# Patient Record
Sex: FEMALE | URBAN - METROPOLITAN AREA
[De-identification: names, ages, dates, MRNs, and addresses within clinical notes are randomized per-mention and may not be internally consistent; named-entity substitution may affect disease eponyms.]

---

## 2020-07-25 ENCOUNTER — TELEPHONE ENCOUNTER (OUTPATIENT)
Dept: URBAN - METROPOLITAN AREA CLINIC 13 | Facility: CLINIC | Age: 65
End: 2020-07-25

## 2020-07-25 RX ORDER — POLYETHYLENE GLYCOL 3350, SODIUM CHLORIDE, SODIUM BICARBONATE AND POTASSIUM CHLORIDE WITH LEMON FLAVOR 420; 11.2; 5.72; 1.48 G/4L; G/4L; G/4L; G/4L
TAKE 1/2 GALLON AT 5:00 PM DAY BEFORE PROCEDURE, TAKE SECOND 1/2 OF GALLON 6 HRS PRIOR TO PROCEDURE POWDER, FOR SOLUTION ORAL
Qty: 1 | Refills: 0 | OUTPATIENT
Start: 2019-10-31 | End: 2019-11-13

## 2020-07-25 RX ORDER — BUPROPION HYDROCHLORIDE 150 MG/1
TAKE 1 TABLET TWICE DAILY TABLET, FILM COATED ORAL
Refills: 0 | OUTPATIENT
Start: 2014-07-08 | End: 2014-07-30

## 2020-07-25 RX ORDER — POLYETHYLENE GLYCOL 3350, SODIUM SULFATE, SODIUM CHLORIDE, POTASSIUM CHLORIDE, ASCORBIC ACID, SODIUM ASCORBATE 7.5-2.691G
USE AS DIRECTED KIT ORAL
Qty: 1 | Refills: 0 | OUTPATIENT
Start: 2014-07-08 | End: 2014-07-30

## 2020-07-25 RX ORDER — IRBESARTAN/HYDROCHLOROTHIAZIDE 300-12.5MG
TAKE 1 TABLET DAILY TABLET ORAL
Refills: 0 | OUTPATIENT
Start: 2005-10-03 | End: 2014-07-08

## 2020-07-25 RX ORDER — TRAMADOL HYDROCHLORIDE AND ACETAMINOPHEN 37.5; 325 MG/1; MG/1
TAKE 1 TABLET 3 TIMES DAILY AS NEEDED TABLET, FILM COATED ORAL
Qty: 30 | Refills: 0 | OUTPATIENT
Start: 2014-08-07 | End: 2019-10-31

## 2020-07-25 RX ORDER — TIOTROPIUM BROMIDE 18 UG/1
INHALE CONTENTS OF CAPSULE ONCE DAY CAPSULE ORAL; RESPIRATORY (INHALATION)
Refills: 0 | OUTPATIENT
Start: 2013-08-03 | End: 2019-10-31

## 2020-07-25 RX ORDER — NEOMYCIN SULFATE, POLYMYXIN B SULFATE AND DEXAMETHASONE 3.5; 10000; 1 MG/ML; [USP'U]/ML; MG/ML
INSTILL 1 DROP TWICE DAILY IN BOTH EYES SUSPENSION/ DROPS OPHTHALMIC
Refills: 0 | OUTPATIENT
Start: 2014-02-07 | End: 2019-10-31

## 2020-07-25 RX ORDER — LISINOPRIL 20 MG/1
TAKE 1 TABLET DAILY TABLET ORAL
Refills: 0 | OUTPATIENT
Start: 2014-07-08 | End: 2019-11-13

## 2020-07-25 RX ORDER — TIOTROPIUM BROMIDE 18 UG/1
INHALE CONTENTS OF 1 CAPSULE ONCE DAILY CAPSULE ORAL; RESPIRATORY (INHALATION)
Refills: 0 | OUTPATIENT
End: 2019-11-13

## 2020-07-26 ENCOUNTER — TELEPHONE ENCOUNTER (OUTPATIENT)
Dept: URBAN - METROPOLITAN AREA CLINIC 13 | Facility: CLINIC | Age: 65
End: 2020-07-26

## 2020-07-26 RX ORDER — FLUTICASONE PROPIONATE AND SALMETEROL 50; 250 UG/1; UG/1
POWDER RESPIRATORY (INHALATION)
Qty: 60 | Refills: 0 | Status: ACTIVE | COMMUNITY
Start: 2013-08-05

## 2020-07-26 RX ORDER — FLUTICASONE PROPIONATE AND SALMETEROL 50; 250 UG/1; UG/1
POWDER RESPIRATORY (INHALATION)
Qty: 60 | Refills: 0 | Status: ACTIVE | COMMUNITY
Start: 2013-10-14

## 2020-07-26 RX ORDER — HYDROCHLOROTHIAZIDE 12.5 MG/1
TAKE 1 CAPSULE DAILY CAPSULE ORAL
Refills: 0 | Status: ACTIVE | COMMUNITY
Start: 2014-07-08

## 2020-07-26 RX ORDER — HYDROCODONE BITARTRATE AND ACETAMINOPHEN 5; 325 MG/1; MG/1
TAKE 1 TABLET EVERY 4 TO 6 HOURS AS NEEDED TABLET ORAL
Refills: 0 | Status: ACTIVE | COMMUNITY
Start: 2019-10-09

## 2020-07-26 RX ORDER — AMLODIPINE BESYLATE 5 MG/1
TAKE 1 TABLET DAILY TABLET ORAL
Refills: 0 | Status: ACTIVE | COMMUNITY

## 2020-07-26 RX ORDER — DICLOFENAC SODIUM 1 MG/ML
SOLUTION OPHTHALMIC
Qty: 5 | Refills: 0 | Status: ACTIVE | COMMUNITY
Start: 2014-02-10

## 2020-07-26 RX ORDER — BUDESONIDE AND FORMOTEROL FUMARATE DIHYDRATE 160; 4.5 UG/1; UG/1
INHALE 2 PUFFS TWICE DAILY. RINSE MOUTH AFTER USE AEROSOL RESPIRATORY (INHALATION)
Refills: 0 | Status: ACTIVE | COMMUNITY

## 2020-07-26 RX ORDER — PREDNISONE 20 MG/1
TABLET ORAL
Qty: 15 | Refills: 0 | Status: ACTIVE | COMMUNITY
Start: 2013-12-14

## 2020-07-26 RX ORDER — FLUTICASONE PROPIONATE 50 UG/1
SPRAY, METERED NASAL
Qty: 48 | Refills: 0 | Status: ACTIVE | COMMUNITY
Start: 2019-03-07

## 2020-07-26 RX ORDER — COLCHICINE 0.6 MG/1
TAKE 1 TABLET TWICE DAILY AS DIRECTED TABLET, FILM COATED ORAL
Refills: 0 | Status: ACTIVE | COMMUNITY

## 2020-07-26 RX ORDER — LEVOCETIRIZINE DIHYDROCHLORIDE 5 MG/1
TABLET ORAL
Qty: 90 | Refills: 0 | Status: ACTIVE | COMMUNITY
Start: 2019-03-07

## 2020-07-26 RX ORDER — PREDNISONE 10 MG/1
TABLET ORAL
Qty: 21 | Refills: 0 | Status: ACTIVE | COMMUNITY
Start: 2019-07-28

## 2020-07-26 RX ORDER — ALBUTEROL SULFATE 90 UG/1
AEROSOL, METERED RESPIRATORY (INHALATION)
Qty: 18 | Refills: 0 | Status: ACTIVE | COMMUNITY
Start: 2019-10-09

## 2020-07-26 RX ORDER — TIZANIDINE 4 MG/1
TAKE 1 TABLET 3 TIMES DAILY AS NEEDED TABLET ORAL
Refills: 0 | Status: ACTIVE | COMMUNITY

## 2020-07-26 RX ORDER — CARVEDILOL 12.5 MG/1
TAKE 1 TABLET TWICE DAILY TABLET, FILM COATED ORAL
Refills: 0 | Status: ACTIVE | COMMUNITY

## 2020-10-20 ENCOUNTER — CLAIMS CREATED FROM THE CLAIM WINDOW (OUTPATIENT)
Dept: URBAN - METROPOLITAN AREA MEDICAL CENTER 2 | Facility: MEDICAL CENTER | Age: 65
End: 2020-10-20
Payer: COMMERCIAL

## 2020-10-20 DIAGNOSIS — R63.4 ABNORMAL WEIGHT LOSS: ICD-10-CM

## 2020-10-20 DIAGNOSIS — R13.10 DYSPHAGIA, UNSPECIFIED: ICD-10-CM

## 2020-10-20 PROCEDURE — 99221 1ST HOSP IP/OBS SF/LOW 40: CPT | Performed by: INTERNAL MEDICINE

## 2020-10-21 ENCOUNTER — OUT OF OFFICE VISIT (OUTPATIENT)
Dept: URBAN - METROPOLITAN AREA MEDICAL CENTER 2 | Facility: MEDICAL CENTER | Age: 65
End: 2020-10-21
Payer: COMMERCIAL

## 2020-10-21 DIAGNOSIS — R13.10 ABNORMAL SWALLOWING: ICD-10-CM

## 2020-10-21 DIAGNOSIS — R63.4 ABNORMAL INTENTIONAL WEIGHT LOSS: ICD-10-CM

## 2020-10-21 DIAGNOSIS — K29.50 ANTRAL GASTRITIS: ICD-10-CM

## 2020-10-21 DIAGNOSIS — B96.81 BACTERIAL INFECTION DUE TO H. PYLORI: ICD-10-CM

## 2020-10-21 PROCEDURE — 43239 EGD BIOPSY SINGLE/MULTIPLE: CPT | Performed by: INTERNAL MEDICINE

## 2020-11-02 ENCOUNTER — OFFICE VISIT (OUTPATIENT)
Dept: URBAN - METROPOLITAN AREA CLINIC 113 | Facility: CLINIC | Age: 65
End: 2020-11-02

## 2020-11-02 RX ORDER — CARVEDILOL 12.5 MG/1
TAKE 1 TABLET TWICE DAILY TABLET, FILM COATED ORAL
Refills: 0 | Status: ACTIVE | COMMUNITY

## 2020-11-02 RX ORDER — AMLODIPINE BESYLATE 5 MG/1
TAKE 1 TABLET DAILY TABLET ORAL
Refills: 0 | Status: ACTIVE | COMMUNITY

## 2020-11-02 RX ORDER — ALBUTEROL SULFATE 90 UG/1
AEROSOL, METERED RESPIRATORY (INHALATION)
Qty: 18 | Refills: 0 | Status: ACTIVE | COMMUNITY
Start: 2019-10-09

## 2020-11-02 RX ORDER — TIZANIDINE 4 MG/1
TAKE 1 TABLET 3 TIMES DAILY AS NEEDED TABLET ORAL
Refills: 0 | Status: ACTIVE | COMMUNITY

## 2020-11-02 RX ORDER — LEVOCETIRIZINE DIHYDROCHLORIDE 5 MG/1
TABLET ORAL
Qty: 90 | Refills: 0 | Status: ACTIVE | COMMUNITY
Start: 2019-03-07

## 2020-11-02 RX ORDER — DICLOFENAC SODIUM 1 MG/ML
SOLUTION OPHTHALMIC
Qty: 5 | Refills: 0 | Status: ACTIVE | COMMUNITY
Start: 2014-02-10

## 2020-11-02 RX ORDER — FLUTICASONE PROPIONATE 50 UG/1
SPRAY, METERED NASAL
Qty: 48 | Refills: 0 | Status: ACTIVE | COMMUNITY
Start: 2019-03-07

## 2020-11-02 RX ORDER — COLCHICINE 0.6 MG/1
TAKE 1 TABLET TWICE DAILY AS DIRECTED TABLET, FILM COATED ORAL
Refills: 0 | Status: ACTIVE | COMMUNITY

## 2020-11-02 RX ORDER — HYDROCODONE BITARTRATE AND ACETAMINOPHEN 5; 325 MG/1; MG/1
TAKE 1 TABLET EVERY 4 TO 6 HOURS AS NEEDED TABLET ORAL
Refills: 0 | Status: ACTIVE | COMMUNITY
Start: 2019-10-09

## 2020-11-02 RX ORDER — PREDNISONE 20 MG/1
TABLET ORAL
Qty: 15 | Refills: 0 | Status: ACTIVE | COMMUNITY
Start: 2013-12-14

## 2020-11-02 RX ORDER — HYDROCHLOROTHIAZIDE 12.5 MG/1
TAKE 1 CAPSULE DAILY CAPSULE ORAL
Refills: 0 | Status: ACTIVE | COMMUNITY
Start: 2014-07-08

## 2020-11-02 RX ORDER — BUDESONIDE AND FORMOTEROL FUMARATE DIHYDRATE 160; 4.5 UG/1; UG/1
INHALE 2 PUFFS TWICE DAILY. RINSE MOUTH AFTER USE AEROSOL RESPIRATORY (INHALATION)
Refills: 0 | Status: ACTIVE | COMMUNITY

## 2020-11-02 RX ORDER — PREDNISONE 10 MG/1
TABLET ORAL
Qty: 21 | Refills: 0 | Status: ACTIVE | COMMUNITY
Start: 2019-07-28

## 2020-11-02 RX ORDER — FLUTICASONE PROPIONATE AND SALMETEROL 50; 250 UG/1; UG/1
POWDER RESPIRATORY (INHALATION)
Qty: 60 | Refills: 0 | Status: ACTIVE | COMMUNITY
Start: 2013-08-05

## 2020-11-02 NOTE — HPI-TODAY'S VISIT:
Ms. De Santiago is a 65-year-old female presenting for follow-up after hospitalization. She was seen on 10/20/2020 at [facility] regarding dysphagia and odynophagia.  EGD on 10/21/2020 was notable for normal esophagus, erythematous mucosa in the antrum, and duodenal deformity.  Gastric biopsies showed chronic active gastritis, positive for H. pylori.  Plan following the procedure was to follow-up as an outpatient, as she was requiring cardiac evaluation in the hospital.

## 2020-11-02 NOTE — HPI-OTHER HISTORIES
Colonoscopy (11/13/19): good prep, removal of a 2mm tubular adenoma in the ascending colon, medium sized lipoma noted in distal ascending colon,medium sized internal hemorrhoids

## 2020-11-06 ENCOUNTER — TELEPHONE ENCOUNTER (OUTPATIENT)
Dept: URBAN - METROPOLITAN AREA CLINIC 113 | Facility: CLINIC | Age: 65
End: 2020-11-06

## 2020-11-30 ENCOUNTER — LAB OUTSIDE AN ENCOUNTER (OUTPATIENT)
Dept: URBAN - METROPOLITAN AREA CLINIC 113 | Facility: CLINIC | Age: 65
End: 2020-11-30

## 2020-11-30 ENCOUNTER — OFFICE VISIT (OUTPATIENT)
Dept: URBAN - METROPOLITAN AREA CLINIC 113 | Facility: CLINIC | Age: 65
End: 2020-11-30
Payer: COMMERCIAL

## 2020-11-30 ENCOUNTER — WEB ENCOUNTER (OUTPATIENT)
Dept: URBAN - METROPOLITAN AREA CLINIC 113 | Facility: CLINIC | Age: 65
End: 2020-11-30

## 2020-11-30 VITALS
TEMPERATURE: 96.8 F | HEIGHT: 64 IN | RESPIRATION RATE: 20 BRPM | HEART RATE: 71 BPM | DIASTOLIC BLOOD PRESSURE: 98 MMHG | WEIGHT: 197 LBS | SYSTOLIC BLOOD PRESSURE: 138 MMHG | BODY MASS INDEX: 33.63 KG/M2

## 2020-11-30 DIAGNOSIS — B96.81 HELICOBACTER POSITIVE GASTRITIS: ICD-10-CM

## 2020-11-30 DIAGNOSIS — K59.01 CONSTIPATION: ICD-10-CM

## 2020-11-30 DIAGNOSIS — R13.10 DYSPHAGIA: ICD-10-CM

## 2020-11-30 PROBLEM — 14760008 CONSTIPATION: Status: ACTIVE | Noted: 2020-11-30

## 2020-11-30 PROBLEM — 40739000 DYSPHAGIA: Status: ACTIVE | Noted: 2020-10-30

## 2020-11-30 PROCEDURE — 99214 OFFICE O/P EST MOD 30 MIN: CPT | Performed by: PHYSICIAN ASSISTANT

## 2020-11-30 PROCEDURE — G8427 DOCREV CUR MEDS BY ELIG CLIN: HCPCS | Performed by: PHYSICIAN ASSISTANT

## 2020-11-30 PROCEDURE — 3017F COLORECTAL CA SCREEN DOC REV: CPT | Performed by: PHYSICIAN ASSISTANT

## 2020-11-30 RX ORDER — PANTOPRAZOLE SODIUM 40 MG/1
1 TABLET TABLET, DELAYED RELEASE ORAL ONCE A DAY
Status: ACTIVE | COMMUNITY

## 2020-11-30 RX ORDER — FUROSEMIDE 20 MG/1
1 TABLET TABLET ORAL ONCE A DAY
Status: ACTIVE | COMMUNITY

## 2020-11-30 RX ORDER — ALLOPURINOL 300 MG/1
1 TABLET TABLET ORAL ONCE A DAY
Status: ACTIVE | COMMUNITY

## 2020-11-30 RX ORDER — APIXABAN 5 MG/1
AS DIRECTED TABLET, FILM COATED ORAL
Status: ACTIVE | COMMUNITY

## 2020-11-30 RX ORDER — BISMUTH SUBCITRATE POTASSIUM, METRONIDAZOLE, TETRACYCLINE HYDROCHLORIDE 140; 125; 125 MG/1; MG/1; MG/1
3 CAPSULES AFTER MEALS AND AT BEDTIME CAPSULE ORAL
Qty: 120 CAPSULE | Refills: 0 | OUTPATIENT

## 2020-11-30 RX ORDER — PANTOPRAZOLE SODIUM 40 MG/1
1 TABLET TABLET, DELAYED RELEASE ORAL ONCE A DAY
OUTPATIENT

## 2020-11-30 NOTE — HPI-TODAY'S VISIT:
Ms. Bhandari is a 65-year-old female with a history of COPD on supplemental oxygen, atrial fibrillation on Eliquis, congestive heart failure, hypertension, and obesity presenting for follow-up after hospitalization. She was seen on 10/20/2020 at [facility] regarding dysphagia and odynophagia.  EGD on 10/21/2020 was notable for normal esophagus, erythematous mucosa in the antrum, and duodenal deformity.  Gastric biopsies showed chronic active gastritis, positive for H. pylori.  Plan following the procedure was to follow-up as an outpatient, as she was requiring cardiac evaluation in the hospital. She spent a couple weeks in inpatient rehab after hospitalization.  She continues to have difficulty eating.  Anything solid, just "wads up" in her mouth and will not go down.  She can take liquids without difficulty.  She forces herself to drink.  She can take smoothies, broths, peanut butter.  She is nauseated all the time, with episodes of vomiting.  She is not hungry. The things that she drinks, she "forces down."  When she eats, she chews and chews and food just becomes a big bolus and she cannot swallow.  She says she has to "spit it out."  She takes her pills without difficulty.  Food smells good, and she wants to eat, but says she "cannot eat."  She denies any reflux or gagging.  She does not have pain while eating.  She is taking pantoprazole 40mg bid and denies any breakthrough heartburn.  She has not been nauseated in the last several days.  She has ondansetron at home that she takes as needed for nausea. She is constipated.  She has the sensation to have a bowel movement, but often does not pass a stool.  She passed a small stool this morning.  There has been red blood when wiping. She is following closely with cardiology, she was given fluids in their office last week.

## 2020-12-07 ENCOUNTER — TELEPHONE ENCOUNTER (OUTPATIENT)
Dept: URBAN - METROPOLITAN AREA CLINIC 113 | Facility: CLINIC | Age: 65
End: 2020-12-07

## 2020-12-07 PROBLEM — 307759003 HELICOBACTER PYLORI GASTROINTESTINAL TRACT INFECTION: Status: ACTIVE | Noted: 2020-10-30

## 2020-12-07 RX ORDER — BISMUTH SUBSALICYLATE 262MG/15ML
2 TABLETS WITH MEALS AND AT BEDTIME SUSPENSION, ORAL (FINAL DOSE FORM) ORAL
Qty: 112 TABLET | OUTPATIENT
Start: 2020-12-07 | End: 2020-12-21

## 2020-12-07 RX ORDER — TETRACYCLINE HYDROCHLORIDE 500 MG/1
1 CAPSULE ON AN EMPTY STOMACH CAPSULE ORAL EVERY 6 HOURS
Qty: 56 CAPSULE | Refills: 0 | OUTPATIENT
Start: 2020-12-07 | End: 2020-12-21

## 2020-12-07 RX ORDER — METRONIDAZOLE 250 MG/1
1 TABLET TABLET ORAL
Qty: 56 TABLET | OUTPATIENT
Start: 2020-12-07 | End: 2020-12-21

## 2020-12-07 RX ORDER — OMEPRAZOLE 40 MG/1
1 CAPSULE 30 MINUTES BEFORE MORNING MEAL CAPSULE, DELAYED RELEASE ORAL BID
Qty: 60 | Refills: 0 | OUTPATIENT
Start: 2020-12-07

## 2020-12-08 ENCOUNTER — TELEPHONE ENCOUNTER (OUTPATIENT)
Dept: URBAN - METROPOLITAN AREA CLINIC 113 | Facility: CLINIC | Age: 65
End: 2020-12-08

## 2020-12-08 RX ORDER — OMEPRAZOLE 40 MG/1
1 CAPSULE 30 MINUTES BEFORE MORNING MEAL CAPSULE, DELAYED RELEASE ORAL BID
Qty: 60 | Refills: 0
Start: 2020-12-07

## 2021-01-15 ENCOUNTER — OUT OF OFFICE VISIT (OUTPATIENT)
Dept: URBAN - METROPOLITAN AREA MEDICAL CENTER 2 | Facility: MEDICAL CENTER | Age: 66
End: 2021-01-15
Payer: COMMERCIAL

## 2021-01-15 DIAGNOSIS — R13.10 ABNORMAL SWALLOWING: ICD-10-CM

## 2021-01-15 PROCEDURE — 91010 ESOPHAGUS MOTILITY STUDY: CPT | Performed by: INTERNAL MEDICINE

## 2022-01-11 ENCOUNTER — TELEPHONE ENCOUNTER (OUTPATIENT)
Dept: URBAN - METROPOLITAN AREA CLINIC 113 | Facility: CLINIC | Age: 67
End: 2022-01-11

## 2022-01-12 ENCOUNTER — OFFICE VISIT (OUTPATIENT)
Dept: URBAN - METROPOLITAN AREA CLINIC 113 | Facility: CLINIC | Age: 67
End: 2022-01-12
Payer: COMMERCIAL

## 2022-01-12 ENCOUNTER — LAB OUTSIDE AN ENCOUNTER (OUTPATIENT)
Dept: URBAN - METROPOLITAN AREA CLINIC 113 | Facility: CLINIC | Age: 67
End: 2022-01-12

## 2022-01-12 VITALS — TEMPERATURE: 97.3 F | BODY MASS INDEX: 34.66 KG/M2 | WEIGHT: 203 LBS | HEIGHT: 64 IN

## 2022-01-12 DIAGNOSIS — R19.7 DIARRHEA, UNSPECIFIED TYPE: ICD-10-CM

## 2022-01-12 DIAGNOSIS — R11.14 BILIOUS VOMITING WITH NAUSEA: ICD-10-CM

## 2022-01-12 PROCEDURE — 99214 OFFICE O/P EST MOD 30 MIN: CPT | Performed by: NURSE PRACTITIONER

## 2022-01-12 RX ORDER — ALLOPURINOL 300 MG/1
1 TABLET TABLET ORAL ONCE A DAY
Status: ACTIVE | COMMUNITY

## 2022-01-12 RX ORDER — PANTOPRAZOLE SODIUM 40 MG/1
1 TABLET TABLET, DELAYED RELEASE ORAL ONCE A DAY
Status: ACTIVE | COMMUNITY

## 2022-01-12 RX ORDER — GABAPENTIN 300 MG/1
1 CAPSULE CAPSULE ORAL ONCE A DAY
Status: ACTIVE | COMMUNITY

## 2022-01-12 RX ORDER — OMEPRAZOLE 40 MG/1
1 CAPSULE 30 MINUTES BEFORE MORNING MEAL CAPSULE, DELAYED RELEASE ORAL BID
Qty: 60 | Refills: 0 | Status: ON HOLD | COMMUNITY
Start: 2020-12-07

## 2022-01-12 RX ORDER — APIXABAN 5 MG/1
AS DIRECTED TABLET, FILM COATED ORAL
Status: ACTIVE | COMMUNITY

## 2022-01-12 RX ORDER — MIRTAZAPINE 15 MG/1
1 TABLET AT BEDTIME TABLET, FILM COATED ORAL ONCE A DAY
Status: ACTIVE | COMMUNITY

## 2022-01-12 RX ORDER — FUROSEMIDE 40 MG/1
2 TABLET TABLET ORAL ONCE A DAY
Status: ACTIVE | COMMUNITY

## 2022-01-12 RX ORDER — BISMUTH SUBCITRATE POTASSIUM, METRONIDAZOLE, TETRACYCLINE HYDROCHLORIDE 140; 125; 125 MG/1; MG/1; MG/1
3 CAPSULES AFTER MEALS AND AT BEDTIME CAPSULE ORAL
Qty: 120 CAPSULE | Refills: 0 | Status: ACTIVE | COMMUNITY

## 2022-01-12 NOTE — HPI-TODAY'S VISIT:
65 yo female with a hsitroy of COPD on supplemental oxygen, atrial fibrillation on Eliquis, congestive heart failure, hypertension, and obesity, presenting for evaluation of nausea and diarrhea.   She was initially seen in our office in October 2005 by Dr. Pack for rectal bleeding and esophageal reflux. She was to continue daily PPI, stop NSAIDs, specifically Goody's powders, and planned for EGD and colonoscopy. EGD 10/26/2005 showed erythema at the GE junction and duodenal diverticulum. Pathology showed presence of H. pylori organisms. IT is unclear if she was treated for H. pylori in 2005. Colonoscopy on 10/26/2005 showed a good prep, removal of a 10 mm tubulovillous adenoma from the sigmoid colon, five 5 mm hyperplastic polyps from the sigmoid colon, and one 5 mm hyperplastic polyp from the descending colon. There were also internal hemorrhoids.   She saw Dr. Mcgee in July 2014 for a surveillance colonoscopy. Colonoscopy 7/30/14 showed a fair prep, nonbleeding internal hemorrhoids, removal of a 3 mm polyp from the descending colon and three 2 to 3 mm polyps from the sigmoid colon. Pathology showed 2 fragments of hyperplastic tissue and 2 fragments of adenomatous tissue. A repeat colonoscopy was recommended in 5 years. A colonoscopy on 11/13/2019 showed a good prep, normal rectum, nonbleeding internal hemorrhoids. removal of a 2 mm tubular adenoma from the ascending colon, a medium sized lipoma in the distal ascending colon, otherwise normal exam. She was recommended a repeat surveillance colonoscopy in 5 years, which will be due in November 2024.  She was evaluated at [facility] in October 2020 for dysphagia and odynophagia. An EGD on 10/21/2020 showed a normal esophagus, erythematous mucosa in the antrum with biopsies positive for H. pylori gastritis, and a duodenal deformity. She was treated with Pylera. At follow up on 11/30/20, she was continuing to have solid dysphagia without any trouble swallowing pills or liquids. She was recommended a barium swallow and esophageal manometry. There was concern for possible psychosocial component to her symptoms.   She contacted the office on 1/11/22 for complaints of diarrhea and nausea for the last 5 days. She has been having nausea and vomiting for the past couple of weeks. She has no appetite. She tells me that she is not gaining weight. She does have congestive heart failure, and tells me that she gained one pound over the last 24 hours. She is taking furosemide 40 mg daily. She is on Eliquis for atrial fibrillation. Nausea is exacerbated by eating. She admits early satiety. She is eating small meals. She is taking mirtazapine 15 mg at bedtime, which is a new prescription. Nausea can be alleviated some with a warm bath or shower. She has bowel movements "every time she moves." Stools are loose. There has been bright red blood mised in the stool. There can be some nocturnal stools. There has been no fever or chills. There is no recent antibiotic use. The only changes to her medications have been gabapentin and mirtazapine. She does not use NSAIDs. No supplements other than a B12 vitamin. She has not tried Imodium for diarrhea. Her diet is minimal currently. She does drink about one beer per day. There is no tobacco use. She denies any marijuana use.

## 2022-01-13 ENCOUNTER — TELEPHONE ENCOUNTER (OUTPATIENT)
Dept: URBAN - METROPOLITAN AREA CLINIC 113 | Facility: CLINIC | Age: 67
End: 2022-01-13

## 2022-01-13 LAB
A/G RATIO: 1.5
ALBUMIN: 3.8
ALKALINE PHOSPHATASE: 104
ALT (SGPT): 15
AMYLASE: 63
AST (SGOT): 26
BASO (ABSOLUTE): 0
BASOS: 0
BILIRUBIN, TOTAL: 0.4
BUN/CREATININE RATIO: 20
BUN: 83
CALCIUM: 9.4
CARBON DIOXIDE, TOTAL: 18
CHLORIDE: 104
CREATININE: 4.23
EGFR IF AFRICN AM: 12
EGFR IF NONAFRICN AM: 10
EOS (ABSOLUTE): 0
EOS: 0
GLOBULIN, TOTAL: 2.6
GLUCOSE: 128
HEMATOCRIT: 38.6
HEMATOLOGY COMMENTS:: (no result)
HEMOGLOBIN: 12.1
IMMATURE CELLS: (no result)
IMMATURE GRANS (ABS): 0
IMMATURE GRANULOCYTES: 0
LIPASE: 17
LYMPHS (ABSOLUTE): 0.8
LYMPHS: 12
MCH: 29.4
MCHC: 31.3
MCV: 94
MONOCYTES(ABSOLUTE): 0.7
MONOCYTES: 10
NEUTROPHILS (ABSOLUTE): 5.1
NEUTROPHILS: 78
NRBC: (no result)
PLATELETS: 249
POTASSIUM: 5.9
PROTEIN, TOTAL: 6.4
RBC: 4.12
RDW: 15.9
SODIUM: 141
WBC: 6.7

## 2022-01-14 ENCOUNTER — TELEPHONE ENCOUNTER (OUTPATIENT)
Dept: URBAN - METROPOLITAN AREA CLINIC 113 | Facility: CLINIC | Age: 67
End: 2022-01-14

## 2022-01-15 ENCOUNTER — CLAIMS CREATED FROM THE CLAIM WINDOW (OUTPATIENT)
Dept: URBAN - METROPOLITAN AREA MEDICAL CENTER 2 | Facility: MEDICAL CENTER | Age: 67
End: 2022-01-15
Payer: COMMERCIAL

## 2022-01-15 DIAGNOSIS — J96.21 ACUTE ON CHRONIC RESPIRATORY FAILURE WITH HYPOXEMIA: ICD-10-CM

## 2022-01-15 DIAGNOSIS — N17.9 ACUTE KIDNEY FAILURE, UNSPECIFIED: ICD-10-CM

## 2022-01-15 DIAGNOSIS — I50.33 ACUTE ON CHRONIC DIASTOLIC (CONGESTIVE) HEART FAILURE: ICD-10-CM

## 2022-01-15 DIAGNOSIS — J44.9 CHRONIC OBSTRUCTIVE PULMONARY DISEASE, UNSPECIFIED: ICD-10-CM

## 2022-01-15 DIAGNOSIS — R19.7 DIARRHEA, UNSPECIFIED: ICD-10-CM

## 2022-01-15 PROCEDURE — 99223 1ST HOSP IP/OBS HIGH 75: CPT | Performed by: INTERNAL MEDICINE

## 2022-01-16 ENCOUNTER — CLAIMS CREATED FROM THE CLAIM WINDOW (OUTPATIENT)
Dept: URBAN - METROPOLITAN AREA MEDICAL CENTER 2 | Facility: MEDICAL CENTER | Age: 67
End: 2022-01-16
Payer: COMMERCIAL

## 2022-01-16 DIAGNOSIS — R14.0 ABDOMINAL BLOATING -: ICD-10-CM

## 2022-01-16 DIAGNOSIS — R19.7 DIARRHEA, UNSPECIFIED: ICD-10-CM

## 2022-01-16 DIAGNOSIS — R06.02 SOB (SHORTNESS OF BREATH): ICD-10-CM

## 2022-01-16 PROCEDURE — 99232 SBSQ HOSP IP/OBS MODERATE 35: CPT | Performed by: INTERNAL MEDICINE

## 2022-01-28 ENCOUNTER — OFFICE VISIT (OUTPATIENT)
Dept: URBAN - METROPOLITAN AREA CLINIC 113 | Facility: CLINIC | Age: 67
End: 2022-01-28

## 2022-01-28 ENCOUNTER — DASHBOARD ENCOUNTERS (OUTPATIENT)
Age: 67
End: 2022-01-28

## 2022-01-28 RX ORDER — ALLOPURINOL 300 MG/1
1 TABLET TABLET ORAL ONCE A DAY
Status: ACTIVE | COMMUNITY

## 2022-01-28 RX ORDER — APIXABAN 5 MG/1
AS DIRECTED TABLET, FILM COATED ORAL
Status: ACTIVE | COMMUNITY

## 2022-01-28 RX ORDER — MIRTAZAPINE 15 MG/1
1 TABLET AT BEDTIME TABLET, FILM COATED ORAL ONCE A DAY
Status: ACTIVE | COMMUNITY

## 2022-01-28 RX ORDER — FUROSEMIDE 40 MG/1
2 TABLET TABLET ORAL ONCE A DAY
Status: ACTIVE | COMMUNITY

## 2022-01-28 RX ORDER — OMEPRAZOLE 40 MG/1
1 CAPSULE 30 MINUTES BEFORE MORNING MEAL CAPSULE, DELAYED RELEASE ORAL BID
Qty: 60 | Refills: 0 | Status: ON HOLD | COMMUNITY
Start: 2020-12-07

## 2022-01-28 RX ORDER — PANTOPRAZOLE SODIUM 40 MG/1
1 TABLET TABLET, DELAYED RELEASE ORAL ONCE A DAY
Status: ACTIVE | COMMUNITY

## 2022-01-28 RX ORDER — BISMUTH SUBCITRATE POTASSIUM, METRONIDAZOLE, TETRACYCLINE HYDROCHLORIDE 140; 125; 125 MG/1; MG/1; MG/1
3 CAPSULES AFTER MEALS AND AT BEDTIME CAPSULE ORAL
Qty: 120 CAPSULE | Refills: 0 | Status: ACTIVE | COMMUNITY

## 2022-01-28 RX ORDER — GABAPENTIN 300 MG/1
1 CAPSULE CAPSULE ORAL ONCE A DAY
Status: ACTIVE | COMMUNITY

## 2022-02-07 ENCOUNTER — OFFICE VISIT (OUTPATIENT)
Dept: URBAN - METROPOLITAN AREA MEDICAL CENTER 19 | Facility: MEDICAL CENTER | Age: 67
End: 2022-02-07

## 2022-02-10 ENCOUNTER — OFFICE VISIT (OUTPATIENT)
Dept: URBAN - METROPOLITAN AREA SURGERY CENTER 25 | Facility: SURGERY CENTER | Age: 67
End: 2022-02-10